# Patient Record
Sex: MALE | URBAN - METROPOLITAN AREA
[De-identification: names, ages, dates, MRNs, and addresses within clinical notes are randomized per-mention and may not be internally consistent; named-entity substitution may affect disease eponyms.]

---

## 2018-03-15 ENCOUNTER — APPOINTMENT (RX ONLY)
Dept: URBAN - METROPOLITAN AREA CLINIC 73 | Facility: CLINIC | Age: 43
Setting detail: DERMATOLOGY
End: 2018-03-15

## 2018-03-15 DIAGNOSIS — D485 NEOPLASM OF UNCERTAIN BEHAVIOR OF SKIN: ICD-10-CM

## 2018-03-15 DIAGNOSIS — D22 MELANOCYTIC NEVI: ICD-10-CM

## 2018-03-15 DIAGNOSIS — R20.2 PARESTHESIA OF SKIN: ICD-10-CM

## 2018-03-15 DIAGNOSIS — L72.0 EPIDERMAL CYST: ICD-10-CM

## 2018-03-15 DIAGNOSIS — L82.1 OTHER SEBORRHEIC KERATOSIS: ICD-10-CM

## 2018-03-15 PROBLEM — D22.5 MELANOCYTIC NEVI OF TRUNK: Status: ACTIVE | Noted: 2018-03-15

## 2018-03-15 PROBLEM — D48.5 NEOPLASM OF UNCERTAIN BEHAVIOR OF SKIN: Status: ACTIVE | Noted: 2018-03-15

## 2018-03-15 PROCEDURE — ? PRESCRIPTION

## 2018-03-15 PROCEDURE — 99203 OFFICE O/P NEW LOW 30 MIN: CPT

## 2018-03-15 PROCEDURE — ? OBSERVATION AND MEASURE

## 2018-03-15 PROCEDURE — ? COUNSELING

## 2018-03-15 PROCEDURE — ? TREATMENT REGIMEN

## 2018-03-15 RX ORDER — TRETINOIN 0.5 MG/G
CREAM TOPICAL
Qty: 1 | Refills: 2 | Status: ERX | COMMUNITY
Start: 2018-03-15

## 2018-03-15 RX ADMIN — TRETINOIN: 0.5 CREAM TOPICAL at 21:03

## 2018-03-15 ASSESSMENT — PAIN INTENSITY VAS: HOW INTENSE IS YOUR PAIN 0 BEING NO PAIN, 10 BEING THE MOST SEVERE PAIN POSSIBLE?: NO PAIN

## 2018-03-15 ASSESSMENT — LOCATION DETAILED DESCRIPTION DERM
LOCATION DETAILED: LEFT SUPERIOR MEDIAL MALAR CHEEK
LOCATION DETAILED: RIGHT SUPERIOR MEDIAL UPPER BACK
LOCATION DETAILED: SUPERIOR THORACIC SPINE

## 2018-03-15 ASSESSMENT — LOCATION SIMPLE DESCRIPTION DERM
LOCATION SIMPLE: UPPER BACK
LOCATION SIMPLE: RIGHT UPPER BACK
LOCATION SIMPLE: LEFT CHEEK

## 2018-03-15 ASSESSMENT — LOCATION ZONE DERM
LOCATION ZONE: TRUNK
LOCATION ZONE: FACE

## 2018-03-15 NOTE — HPI: SKIN LESION
Is This A New Presentation, Or A Follow-Up?: Skin Lesion
How Severe Is Your Skin Lesion?: moderate
Has Your Skin Lesion Been Treated?: not been treated
Is This A New Presentation, Or A Follow-Up?: Growths

## 2018-03-15 NOTE — PROCEDURE: TREATMENT REGIMEN
Samples Given: LUCITA 0.06%
Initiate Treatment: RA 0.05% crm qhs
Detail Level: Zone
Plan: Pt declining extraction for now.
Otc Regimen: Sarna lotion

## 2021-11-08 LAB
RAPID GROUP A STREP (OHS): NEGATIVE
SARS-COV-2 RNA RESP QL NAA+PROBE: NEGATIVE

## 2022-04-10 ENCOUNTER — HISTORICAL (OUTPATIENT)
Dept: ADMINISTRATIVE | Facility: HOSPITAL | Age: 47
End: 2022-04-10

## 2022-04-29 VITALS
OXYGEN SATURATION: 98 % | SYSTOLIC BLOOD PRESSURE: 135 MMHG | WEIGHT: 225 LBS | DIASTOLIC BLOOD PRESSURE: 93 MMHG | HEIGHT: 70 IN | BODY MASS INDEX: 32.21 KG/M2

## 2022-07-25 ENCOUNTER — OFFICE VISIT (OUTPATIENT)
Dept: URGENT CARE | Facility: CLINIC | Age: 47
End: 2022-07-25
Payer: COMMERCIAL

## 2022-07-25 VITALS
OXYGEN SATURATION: 100 % | HEIGHT: 70 IN | BODY MASS INDEX: 30.78 KG/M2 | HEART RATE: 69 BPM | RESPIRATION RATE: 17 BRPM | TEMPERATURE: 98 F | WEIGHT: 215 LBS | DIASTOLIC BLOOD PRESSURE: 72 MMHG | SYSTOLIC BLOOD PRESSURE: 107 MMHG

## 2022-07-25 DIAGNOSIS — J00 NASOPHARYNGITIS: Primary | ICD-10-CM

## 2022-07-25 PROCEDURE — 3078F DIAST BP <80 MM HG: CPT | Mod: CPTII,,, | Performed by: FAMILY MEDICINE

## 2022-07-25 PROCEDURE — 96372 THER/PROPH/DIAG INJ SC/IM: CPT | Mod: ,,, | Performed by: FAMILY MEDICINE

## 2022-07-25 PROCEDURE — 3074F PR MOST RECENT SYSTOLIC BLOOD PRESSURE < 130 MM HG: ICD-10-PCS | Mod: CPTII,,, | Performed by: FAMILY MEDICINE

## 2022-07-25 PROCEDURE — 1160F RVW MEDS BY RX/DR IN RCRD: CPT | Mod: CPTII,,, | Performed by: FAMILY MEDICINE

## 2022-07-25 PROCEDURE — 3008F BODY MASS INDEX DOCD: CPT | Mod: CPTII,,, | Performed by: FAMILY MEDICINE

## 2022-07-25 PROCEDURE — 1159F PR MEDICATION LIST DOCUMENTED IN MEDICAL RECORD: ICD-10-PCS | Mod: CPTII,,, | Performed by: FAMILY MEDICINE

## 2022-07-25 PROCEDURE — 1160F PR REVIEW ALL MEDS BY PRESCRIBER/CLIN PHARMACIST DOCUMENTED: ICD-10-PCS | Mod: CPTII,,, | Performed by: FAMILY MEDICINE

## 2022-07-25 PROCEDURE — 3074F SYST BP LT 130 MM HG: CPT | Mod: CPTII,,, | Performed by: FAMILY MEDICINE

## 2022-07-25 PROCEDURE — 3078F PR MOST RECENT DIASTOLIC BLOOD PRESSURE < 80 MM HG: ICD-10-PCS | Mod: CPTII,,, | Performed by: FAMILY MEDICINE

## 2022-07-25 PROCEDURE — 99213 PR OFFICE/OUTPT VISIT, EST, LEVL III, 20-29 MIN: ICD-10-PCS | Mod: 25,,, | Performed by: FAMILY MEDICINE

## 2022-07-25 PROCEDURE — 96372 PR INJECTION,THERAP/PROPH/DIAG2ST, IM OR SUBCUT: ICD-10-PCS | Mod: ,,, | Performed by: FAMILY MEDICINE

## 2022-07-25 PROCEDURE — 99213 OFFICE O/P EST LOW 20 MIN: CPT | Mod: 25,,, | Performed by: FAMILY MEDICINE

## 2022-07-25 PROCEDURE — 1159F MED LIST DOCD IN RCRD: CPT | Mod: CPTII,,, | Performed by: FAMILY MEDICINE

## 2022-07-25 PROCEDURE — 3008F PR BODY MASS INDEX (BMI) DOCUMENTED: ICD-10-PCS | Mod: CPTII,,, | Performed by: FAMILY MEDICINE

## 2022-07-25 RX ORDER — BETAMETHASONE SODIUM PHOSPHATE AND BETAMETHASONE ACETATE 3; 3 MG/ML; MG/ML
6 INJECTION, SUSPENSION INTRA-ARTICULAR; INTRALESIONAL; INTRAMUSCULAR; SOFT TISSUE
Status: COMPLETED | OUTPATIENT
Start: 2022-07-25 | End: 2022-07-25

## 2022-07-25 RX ADMIN — BETAMETHASONE SODIUM PHOSPHATE AND BETAMETHASONE ACETATE 6 MG: 3; 3 INJECTION, SUSPENSION INTRA-ARTICULAR; INTRALESIONAL; INTRAMUSCULAR; SOFT TISSUE at 12:07

## 2022-07-25 NOTE — PROGRESS NOTES
"Subjective:       Patient ID: Nieves Abrams is a 47 y.o. male.    Vitals:  height is 5' 10" (1.778 m) and weight is 97.5 kg (215 lb). His temperature is 98.2 °F (36.8 °C). His blood pressure is 107/72 and his pulse is 69. His respiration is 17 and oxygen saturation is 100%.     Chief Complaint: Sinus Problem    3 days of nasal drip, sinus pressure, sore throat. Of note pt had covid x 1 month ago.  No current fever or chest pain.      Sinus Problem  Associated symptoms include congestion, coughing, sinus pressure and a sore throat. Pertinent negatives include no shortness of breath.       Constitution: Negative for fever.   HENT: Positive for congestion, postnasal drip, sinus pressure and sore throat.    Cardiovascular: Negative for chest pain and palpitations.   Respiratory: Positive for cough. Negative for chest tightness and shortness of breath.    Gastrointestinal: Negative for nausea and vomiting.       Objective:      Physical Exam   Constitutional: He is oriented to person, place, and time. He appears well-developed. No distress.   HENT:   Head: Normocephalic.   Ears:   Right Ear: Tympanic membrane and external ear normal.   Left Ear: Tympanic membrane and external ear normal.   Nose: Rhinorrhea present.   Mouth/Throat: Uvula is midline and mucous membranes are normal. No uvula swelling. Cobblestoning present. No oropharyngeal exudate or posterior oropharyngeal edema. Tonsils are 0 on the right. Tonsils are 0 on the left. No tonsillar exudate.   Eyes: Pupils are equal, round, and reactive to light. Right eye exhibits no discharge. Left eye exhibits no discharge.   Neck: Neck supple. No tracheal deviation present.   Cardiovascular: Normal rate, regular rhythm and normal heart sounds.   No murmur heard.  Pulmonary/Chest: Effort normal and breath sounds normal. No stridor. No respiratory distress. He has no wheezes.   Lymphadenopathy:     He has no cervical adenopathy.   Neurological: He is alert and oriented " to person, place, and time.   Skin: Skin is warm and dry.   Psychiatric: Mood and thought content normal.   Nursing note and vitals reviewed.        Assessment:       1. Nasopharyngitis          Plan:         Nasopharyngitis  -     betamethasone acetate-betamethasone sodium phosphate injection 6 mg

## 2022-07-25 NOTE — PATIENT INSTRUCTIONS
Flonase and saline nasal spray twice a day, antihistamine at bedtime.  Force fluids. Cough may linger a few weeks but should not have fever, chest pain, or shortness of breath.   If sinus pressure remains for 6 days recommend calling us for consideration of antibiotic like Augmentin.

## 2022-08-07 ENCOUNTER — CLINICAL SUPPORT (OUTPATIENT)
Dept: URGENT CARE | Facility: CLINIC | Age: 47
End: 2022-08-07
Payer: COMMERCIAL

## 2022-08-07 VITALS
HEIGHT: 70 IN | SYSTOLIC BLOOD PRESSURE: 119 MMHG | TEMPERATURE: 98 F | HEART RATE: 70 BPM | DIASTOLIC BLOOD PRESSURE: 81 MMHG | WEIGHT: 215 LBS | RESPIRATION RATE: 16 BRPM | BODY MASS INDEX: 30.78 KG/M2 | OXYGEN SATURATION: 97 %

## 2022-08-07 DIAGNOSIS — J01.40 SUBACUTE PANSINUSITIS: Primary | ICD-10-CM

## 2022-08-07 PROCEDURE — 99212 OFFICE O/P EST SF 10 MIN: CPT | Mod: ,,, | Performed by: FAMILY MEDICINE

## 2022-08-07 PROCEDURE — 99212 PR OFFICE/OUTPT VISIT, EST, LEVL II, 10-19 MIN: ICD-10-PCS | Mod: ,,, | Performed by: FAMILY MEDICINE

## 2022-08-07 RX ORDER — AZITHROMYCIN 250 MG/1
TABLET, FILM COATED ORAL
Qty: 6 TABLET | Refills: 0 | Status: SHIPPED | OUTPATIENT
Start: 2022-08-07

## 2022-08-07 NOTE — PROGRESS NOTES
"Subjective:       Patient ID: Nieves Abrams is a 47 y.o. male.    Vitals:  height is 5' 10" (1.778 m) and weight is 97.5 kg (215 lb). His temperature is 98.1 °F (36.7 °C). His blood pressure is 119/81 and his pulse is 70. His respiration is 16 and oxygen saturation is 97%.     Chief Complaint: Sinus Problem (Still having sinus pressure, thick discolored phlem, not improved since visit on 07/25)    HPI:  47-year-old returns to clinic with continuing nasal congestion, sinus congestion, change in mucus color and thickness.  Seen in the clinic 2 weeks ago and received cortisone shot.  No fever, no body aches or headache.  States known for sinus infections in the past.  Requesting for antibiotic Z-Jh as it always helped.    ROS  :  Constitutional_ no fever, body aches or headache  HENT_ no sore throat or difficulty swallowing.  Other history As per HPI  Respiratory_no wheezing, no shortness of breath  Cardiovascular_no chest pain  Gastrointestinal_ No  vomiting, No diarrhea, No abdominal pain  Musculoskeletal_no joint pain, no joint swelling  Integumentary_no skin rash      Objective:      Physical Exam    General : Alert and Oriented, No apparent distress, afebrile  Neck - supple  HENT : Oropharynx no redness or swelling.TMs intact mild fluid no redness.   Respiratory : Bilateral equal breath sounds, nonlabored respirations  Cardiovascular : Rate, rhythm regular, normal volume pulse, no murmur  Integumentary : Warm, Dry and no rash    Assessment:       1. Subacute pansinusitis          Plan:       Cool mist vaporizer to keep there was moist and help draining sinuses.  Antihistamine of choice over-the-counter for condition.  Nasal rinse to unplug the sinuses.  Tylenol ibuprofen for pain and discomfort.  Call or return to clinic for any questions.    Subacute pansinusitis  -     azithromycin (Z-JH) 250 MG tablet; Take 2 tablets by mouth on day 1; Take 1 tablet by mouth on days 2-5  Dispense: 6 tablet; Refill: 0      "

## 2022-08-07 NOTE — PATIENT INSTRUCTIONS
Cool mist vaporizer to keep there was moist and help draining sinuses.  Antihistamine of choice over-the-counter for condition.  Nasal rinse to unplug the sinuses.  Tylenol ibuprofen for pain and discomfort.  Call or return to clinic for any questions.

## 2022-09-16 ENCOUNTER — HISTORICAL (OUTPATIENT)
Dept: ADMINISTRATIVE | Facility: HOSPITAL | Age: 47
End: 2022-09-16
Payer: COMMERCIAL

## 2023-02-25 ENCOUNTER — OFFICE VISIT (OUTPATIENT)
Dept: URGENT CARE | Facility: CLINIC | Age: 48
End: 2023-02-25
Payer: COMMERCIAL

## 2023-02-25 VITALS
HEIGHT: 70 IN | OXYGEN SATURATION: 98 % | BODY MASS INDEX: 29.35 KG/M2 | RESPIRATION RATE: 18 BRPM | HEART RATE: 88 BPM | SYSTOLIC BLOOD PRESSURE: 104 MMHG | DIASTOLIC BLOOD PRESSURE: 73 MMHG | TEMPERATURE: 99 F | WEIGHT: 205 LBS

## 2023-02-25 DIAGNOSIS — J30.9 ALLERGIC SINUSITIS: Primary | ICD-10-CM

## 2023-02-25 PROCEDURE — 1160F RVW MEDS BY RX/DR IN RCRD: CPT | Mod: CPTII,,, | Performed by: FAMILY MEDICINE

## 2023-02-25 PROCEDURE — 99213 OFFICE O/P EST LOW 20 MIN: CPT | Mod: 25,,, | Performed by: FAMILY MEDICINE

## 2023-02-25 PROCEDURE — 3078F PR MOST RECENT DIASTOLIC BLOOD PRESSURE < 80 MM HG: ICD-10-PCS | Mod: CPTII,,, | Performed by: FAMILY MEDICINE

## 2023-02-25 PROCEDURE — 1159F PR MEDICATION LIST DOCUMENTED IN MEDICAL RECORD: ICD-10-PCS | Mod: CPTII,,, | Performed by: FAMILY MEDICINE

## 2023-02-25 PROCEDURE — 3078F DIAST BP <80 MM HG: CPT | Mod: CPTII,,, | Performed by: FAMILY MEDICINE

## 2023-02-25 PROCEDURE — 3074F SYST BP LT 130 MM HG: CPT | Mod: CPTII,,, | Performed by: FAMILY MEDICINE

## 2023-02-25 PROCEDURE — 3008F PR BODY MASS INDEX (BMI) DOCUMENTED: ICD-10-PCS | Mod: CPTII,,, | Performed by: FAMILY MEDICINE

## 2023-02-25 PROCEDURE — 96372 THER/PROPH/DIAG INJ SC/IM: CPT | Mod: ,,, | Performed by: FAMILY MEDICINE

## 2023-02-25 PROCEDURE — 1160F PR REVIEW ALL MEDS BY PRESCRIBER/CLIN PHARMACIST DOCUMENTED: ICD-10-PCS | Mod: CPTII,,, | Performed by: FAMILY MEDICINE

## 2023-02-25 PROCEDURE — 99213 PR OFFICE/OUTPT VISIT, EST, LEVL III, 20-29 MIN: ICD-10-PCS | Mod: 25,,, | Performed by: FAMILY MEDICINE

## 2023-02-25 PROCEDURE — 96372 PR INJECTION,THERAP/PROPH/DIAG2ST, IM OR SUBCUT: ICD-10-PCS | Mod: ,,, | Performed by: FAMILY MEDICINE

## 2023-02-25 PROCEDURE — 3074F PR MOST RECENT SYSTOLIC BLOOD PRESSURE < 130 MM HG: ICD-10-PCS | Mod: CPTII,,, | Performed by: FAMILY MEDICINE

## 2023-02-25 PROCEDURE — 3008F BODY MASS INDEX DOCD: CPT | Mod: CPTII,,, | Performed by: FAMILY MEDICINE

## 2023-02-25 PROCEDURE — 1159F MED LIST DOCD IN RCRD: CPT | Mod: CPTII,,, | Performed by: FAMILY MEDICINE

## 2023-02-25 RX ORDER — BETAMETHASONE SODIUM PHOSPHATE AND BETAMETHASONE ACETATE 3; 3 MG/ML; MG/ML
6 INJECTION, SUSPENSION INTRA-ARTICULAR; INTRALESIONAL; INTRAMUSCULAR; SOFT TISSUE
Status: COMPLETED | OUTPATIENT
Start: 2023-02-25 | End: 2023-02-25

## 2023-02-25 RX ADMIN — BETAMETHASONE SODIUM PHOSPHATE AND BETAMETHASONE ACETATE 6 MG: 3; 3 INJECTION, SUSPENSION INTRA-ARTICULAR; INTRALESIONAL; INTRAMUSCULAR; SOFT TISSUE at 09:02

## 2023-02-25 NOTE — PROGRESS NOTES
"Subjective:       Patient ID: Nieves Abrams is a 47 y.o. male.    Vitals:  height is 5' 10" (1.778 m) and weight is 93 kg (205 lb). His oral temperature is 98.8 °F (37.1 °C). His blood pressure is 104/73 and his pulse is 88. His respiration is 18 and oxygen saturation is 98%.     Chief Complaint: Nasal Congestion and sneezing (47 y.o. male presents to clinic c/o nasal congestion, sneezing x 2 days. )    47 y.o. male presents to clinic c/o nasal congestion, sneezing x 2 days. Declines testing today.     Constitution: Negative for fever.   HENT:  Positive for congestion, postnasal drip, sinus pressure and sore throat.    Cardiovascular:  Negative for chest pain and palpitations.   Respiratory:  Positive for cough. Negative for chest tightness and shortness of breath.    Gastrointestinal:  Negative for nausea and vomiting.     Objective:      Physical Exam   Constitutional: He is oriented to person, place, and time. He appears well-developed. No distress.   HENT:   Head: Normocephalic.   Ears:   Right Ear: Tympanic membrane and external ear normal.   Left Ear: Tympanic membrane and external ear normal.   Nose: Rhinorrhea present.   Mouth/Throat: Uvula is midline and mucous membranes are normal. No uvula swelling. Cobblestoning present. No oropharyngeal exudate or posterior oropharyngeal edema. Tonsils are 0 on the right. Tonsils are 0 on the left. No tonsillar exudate.   Eyes: Pupils are equal, round, and reactive to light. Right eye exhibits no discharge. Left eye exhibits no discharge.   Neck: Neck supple. No tracheal deviation present.   Cardiovascular: Normal rate, regular rhythm and normal heart sounds.   No murmur heard.  Pulmonary/Chest: Effort normal and breath sounds normal. No stridor. No respiratory distress. He has no wheezes.   Lymphadenopathy:     He has no cervical adenopathy.   Neurological: no focal deficit. He is alert and oriented to person, place, and time.   Skin: Skin is warm and dry. "   Psychiatric: Mood and thought content normal.   Nursing note and vitals reviewed.      Assessment:       1. Allergic sinusitis          Plan:         Allergic sinusitis  -     betamethasone acetate-betamethasone sodium phosphate injection 6 mg

## 2023-02-25 NOTE — PATIENT INSTRUCTIONS
Flonase and saline nasal spray twice a day, antihistamine at bedtime.  Force fluids.  Celestone injection given today, discussed sided effects.

## 2023-08-26 ENCOUNTER — OFFICE VISIT (OUTPATIENT)
Dept: URGENT CARE | Facility: CLINIC | Age: 48
End: 2023-08-26
Payer: COMMERCIAL

## 2023-08-26 VITALS
BODY MASS INDEX: 29.35 KG/M2 | RESPIRATION RATE: 20 BRPM | DIASTOLIC BLOOD PRESSURE: 77 MMHG | TEMPERATURE: 98 F | HEART RATE: 62 BPM | SYSTOLIC BLOOD PRESSURE: 113 MMHG | HEIGHT: 70 IN | WEIGHT: 205 LBS | OXYGEN SATURATION: 99 %

## 2023-08-26 DIAGNOSIS — J00 NASOPHARYNGITIS: Primary | ICD-10-CM

## 2023-08-26 PROCEDURE — 99213 OFFICE O/P EST LOW 20 MIN: CPT | Mod: 25,,, | Performed by: FAMILY MEDICINE

## 2023-08-26 PROCEDURE — 96372 THER/PROPH/DIAG INJ SC/IM: CPT | Mod: ,,, | Performed by: FAMILY MEDICINE

## 2023-08-26 PROCEDURE — 96372 PR INJECTION,THERAP/PROPH/DIAG2ST, IM OR SUBCUT: ICD-10-PCS | Mod: ,,, | Performed by: FAMILY MEDICINE

## 2023-08-26 PROCEDURE — 99213 PR OFFICE/OUTPT VISIT, EST, LEVL III, 20-29 MIN: ICD-10-PCS | Mod: 25,,, | Performed by: FAMILY MEDICINE

## 2023-08-26 RX ORDER — BETAMETHASONE SODIUM PHOSPHATE AND BETAMETHASONE ACETATE 3; 3 MG/ML; MG/ML
6 INJECTION, SUSPENSION INTRA-ARTICULAR; INTRALESIONAL; INTRAMUSCULAR; SOFT TISSUE
Status: COMPLETED | OUTPATIENT
Start: 2023-08-26 | End: 2023-08-26

## 2023-08-26 RX ADMIN — BETAMETHASONE SODIUM PHOSPHATE AND BETAMETHASONE ACETATE 6 MG: 3; 3 INJECTION, SUSPENSION INTRA-ARTICULAR; INTRALESIONAL; INTRAMUSCULAR; SOFT TISSUE at 08:08

## 2023-08-26 NOTE — PATIENT INSTRUCTIONS
Flonase and saline nasal spray twice a day, antihistamine at bedtime.  Force fluids.  Celestone injection given today.

## 2023-08-26 NOTE — PROGRESS NOTES
"Subjective:      Patient ID: Nieves Abrams is a 48 y.o. male.    Vitals:  height is 5' 10" (1.778 m) and weight is 93 kg (205 lb). His temperature is 97.7 °F (36.5 °C). His blood pressure is 113/77 and his pulse is 62. His respiration is 20 and oxygen saturation is 99%.     Chief Complaint: Cough (Productive cough and dry, dull headache, chest congestion, sore throat,  post nasal drip, started 3 wks ago, taken zinc, vitamin c, d3, colostrum, some relief. )    3 weeks of continued cough and congestion. No focal chest pain. No recent fever. No TUCKER.         Constitution: Negative for fever.   HENT:  Positive for congestion, postnasal drip, sinus pressure and sore throat.    Cardiovascular:  Negative for chest pain and palpitations.   Respiratory:  Positive for cough. Negative for chest tightness and shortness of breath.    Gastrointestinal:  Negative for nausea and vomiting.      Objective:     Physical Exam   Constitutional: He is oriented to person, place, and time. He appears well-developed.  Non-toxic appearance. No distress.   HENT:   Head: Normocephalic.   Ears:   Right Ear: Tympanic membrane and external ear normal.   Left Ear: Tympanic membrane and external ear normal.   Nose: Rhinorrhea present.   Mouth/Throat: Uvula is midline and mucous membranes are normal. No uvula swelling. Posterior oropharyngeal erythema and cobblestoning present. No oropharyngeal exudate or posterior oropharyngeal edema. Tonsils are 0 on the right. Tonsils are 0 on the left. No tonsillar exudate.   Eyes: Pupils are equal, round, and reactive to light. Right eye exhibits no discharge. Left eye exhibits no discharge.   Neck: Neck supple. No tracheal deviation present.   Cardiovascular: Normal rate, regular rhythm and normal heart sounds.   No murmur heard.  Pulmonary/Chest: Effort normal and breath sounds normal. No stridor. No respiratory distress. He has no wheezes.   Lymphadenopathy:     He has no cervical adenopathy. "   Neurological: no focal deficit. He is alert and oriented to person, place, and time.   Skin: Skin is warm and dry.   Psychiatric: Mood and thought content normal.   Nursing note and vitals reviewed.      Assessment:     1. Nasopharyngitis        Plan:       Nasopharyngitis  -     betamethasone acetate-betamethasone sodium phosphate injection 6 mg

## 2024-06-02 ENCOUNTER — OFFICE VISIT (OUTPATIENT)
Dept: URGENT CARE | Facility: CLINIC | Age: 49
End: 2024-06-02
Payer: COMMERCIAL

## 2024-06-02 VITALS
TEMPERATURE: 99 F | BODY MASS INDEX: 31.35 KG/M2 | RESPIRATION RATE: 16 BRPM | SYSTOLIC BLOOD PRESSURE: 110 MMHG | HEART RATE: 69 BPM | DIASTOLIC BLOOD PRESSURE: 77 MMHG | OXYGEN SATURATION: 98 % | WEIGHT: 219 LBS | HEIGHT: 70 IN

## 2024-06-02 DIAGNOSIS — J30.9 ALLERGIC SINUSITIS: Primary | ICD-10-CM

## 2024-06-02 PROCEDURE — 96372 THER/PROPH/DIAG INJ SC/IM: CPT | Mod: ,,,

## 2024-06-02 PROCEDURE — 99213 OFFICE O/P EST LOW 20 MIN: CPT | Mod: 25,,,

## 2024-06-02 RX ORDER — BETAMETHASONE SODIUM PHOSPHATE AND BETAMETHASONE ACETATE 3; 3 MG/ML; MG/ML
9 INJECTION, SUSPENSION INTRA-ARTICULAR; INTRALESIONAL; INTRAMUSCULAR; SOFT TISSUE
Status: COMPLETED | OUTPATIENT
Start: 2024-06-02 | End: 2024-06-02

## 2024-06-02 RX ADMIN — BETAMETHASONE SODIUM PHOSPHATE AND BETAMETHASONE ACETATE 9 MG: 3; 3 INJECTION, SUSPENSION INTRA-ARTICULAR; INTRALESIONAL; INTRAMUSCULAR; SOFT TISSUE at 08:06

## 2024-06-02 NOTE — PATIENT INSTRUCTIONS
Take OTC Decongestants  (Claritin D/sudafed OR Coricidin for people with HTN) to cut down on the fluid in the lining of your nose and relieve swollen nasal passages and congestion. May also use cough/cold/congestion medication such as Dayquil/Nyquil and/or antihistamine such as Claritin/Zyrtec/Allegra.  Cepacol sore throat lozenges/spray if needed.     OTC Zinc Lozenge to help decrease cold symptoms faster.    Drink plenty of fluids.  Rest.      Follow up with primary care in 5-6 days if not better.     Go directly to emergency room if you begin to have shortness of breath, chest pain, or other worrisome symptoms.

## 2024-06-02 NOTE — PROGRESS NOTES
"Subjective:      Patient ID: Nieves Abrams is a 49 y.o. male.    Vitals:  height is 5' 10" (1.778 m) and weight is 99.3 kg (219 lb). His temperature is 98.5 °F (36.9 °C). His blood pressure is 110/77 and his pulse is 69. His respiration is 16 and oxygen saturation is 98%.     Chief Complaint: Sinus Problem    Patient is a 49 y.o. male who presents to urgent care with complaints of nasal congestion, PND, throat irritation, sinus pressure, mild cough due to throat irritation x 4 days. Alleviating factors include Ibuprofen, Vitamin C, Zinc with moderate amount of relief. Patient denies fever, SOB, nausea, vomiting, or diarrhea. Patient is requesting steroid injection.     Sinus Problem  Associated symptoms include congestion, coughing and a sore throat.     HENT:  Positive for congestion, postnasal drip and sore throat.    Respiratory:  Positive for cough.       Objective:     Physical Exam   Constitutional: He is oriented to person, place, and time. He appears well-developed. He is cooperative.  Non-toxic appearance. He does not appear ill. No distress.   HENT:   Head: Normocephalic and atraumatic.   Ears:   Right Ear: Hearing, tympanic membrane, external ear and ear canal normal.   Left Ear: Hearing, tympanic membrane, external ear and ear canal normal.   Nose: Congestion present.   Mouth/Throat: Uvula is midline and mucous membranes are normal. No trismus in the jaw. Normal dentition. No uvula swelling. Posterior oropharyngeal erythema present. No oropharyngeal exudate or posterior oropharyngeal edema.      Comments:  Clear postnasal drip noted  Eyes: Conjunctivae and lids are normal. Right conjunctiva is not injected. Left conjunctiva is not injected.   Neck: Neck supple. No edema present. No erythema present. No neck rigidity present.   Cardiovascular: Normal rate, regular rhythm, normal heart sounds and normal pulses.   Pulmonary/Chest: Effort normal and breath sounds normal. No respiratory distress. He has no " decreased breath sounds. He has no rhonchi.   Abdominal: Normal appearance and bowel sounds are normal. There is no abdominal tenderness.   Neurological: He is alert and oriented to person, place, and time. He exhibits normal muscle tone. Coordination normal.   Skin: Skin is warm, intact, not diaphoretic and no rash.   Psychiatric: His speech is normal and behavior is normal. Mood, judgment and thought content normal.   Nursing note and vitals reviewed.      Assessment:     1. Allergic sinusitis        Plan:       Allergic sinusitis  -     betamethasone acetate-betamethasone sodium phosphate injection 9 mg      Take OTC Decongestants  (Claritin D/sudafed OR Coricidin for people with HTN) to cut down on the fluid in the lining of your nose and relieve swollen nasal passages and congestion. May also use cough/cold/congestion medication such as Dayquil/Nyquil and/or antihistamine such as Claritin/Zyrtec/Allegra.  Cepacol sore throat lozenges/spray if needed.     OTC Zinc Lozenge to help decrease cold symptoms faster.    Drink plenty of fluids.  Rest.      Follow up with primary care in 5-6 days if not better.     Go directly to emergency room if you begin to have shortness of breath, chest pain, or other worrisome symptoms.

## 2024-10-14 ENCOUNTER — OFFICE VISIT (OUTPATIENT)
Dept: URGENT CARE | Facility: CLINIC | Age: 49
End: 2024-10-14
Payer: COMMERCIAL

## 2024-10-14 VITALS
BODY MASS INDEX: 32.35 KG/M2 | WEIGHT: 226 LBS | TEMPERATURE: 99 F | HEIGHT: 70 IN | RESPIRATION RATE: 18 BRPM | HEART RATE: 86 BPM | SYSTOLIC BLOOD PRESSURE: 119 MMHG | OXYGEN SATURATION: 100 % | DIASTOLIC BLOOD PRESSURE: 79 MMHG

## 2024-10-14 DIAGNOSIS — J01.90 ACUTE SINUSITIS, RECURRENCE NOT SPECIFIED, UNSPECIFIED LOCATION: Primary | ICD-10-CM

## 2024-10-14 PROCEDURE — 99213 OFFICE O/P EST LOW 20 MIN: CPT | Mod: 25,,,

## 2024-10-14 PROCEDURE — 96372 THER/PROPH/DIAG INJ SC/IM: CPT | Mod: ,,,

## 2024-10-14 RX ORDER — DEXAMETHASONE SODIUM PHOSPHATE 10 MG/ML
10 INJECTION INTRAMUSCULAR; INTRAVENOUS
Status: COMPLETED | OUTPATIENT
Start: 2024-10-14 | End: 2024-10-14

## 2024-10-14 RX ADMIN — DEXAMETHASONE SODIUM PHOSPHATE 10 MG: 10 INJECTION INTRAMUSCULAR; INTRAVENOUS at 12:10

## 2024-10-14 NOTE — PROGRESS NOTES
"Subjective:      Patient ID: Nieves Abrams is a 49 y.o. male.    Vitals:  height is 5' 10" (1.778 m) and weight is 102.5 kg (226 lb). His oral temperature is 98.6 °F (37 °C). His blood pressure is 119/79 and his pulse is 86. His respiration is 18 and oxygen saturation is 100%.     Chief Complaint: Sinus Problem     Patient is a 49 y.o. male who presents to urgent care with complaints of nasal congestion, sinus pressure, post nasal drip, intermittent cough x10 days. Unresponsive to OTC meds. 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  Patient denies fever, body aches, chills, chest pain, SOB, wheezing.      ROS   Objective:     Physical Exam   Constitutional: He is oriented to person, place, and time. He appears well-developed. He is cooperative.  Non-toxic appearance. He does not appear ill. No distress.   HENT:   Head: Normocephalic and atraumatic.   Ears:   Right Ear: Hearing and external ear normal. A middle ear effusion is present.   Left Ear: Hearing and external ear normal. A middle ear effusion is present.   Nose: Congestion present. Right sinus exhibits maxillary sinus tenderness. Left sinus exhibits maxillary sinus tenderness.   Mouth/Throat: Mucous membranes are normal. Mucous membranes are moist. Posterior oropharyngeal erythema (clear pnd) present. Oropharynx is clear.   Eyes: Conjunctivae and lids are normal.   Neck: Trachea normal and phonation normal. Neck supple. No edema present. No erythema present. No neck rigidity present.   Cardiovascular: Normal rate, regular rhythm and normal heart sounds.   Pulmonary/Chest: Effort normal and breath sounds normal. No stridor. No respiratory distress. He has no decreased breath sounds. He has no wheezes. He has no rhonchi. He has no rales.   Abdominal: Normal appearance.   Neurological: He is alert and oriented to person, place, and time. He exhibits normal muscle tone.   Skin: Skin is warm, intact, not diaphoretic " and no rash. Capillary refill takes less than 2 seconds.   Psychiatric: His speech is normal and behavior is normal. Mood normal.   Nursing note and vitals reviewed.      Assessment:     1. Acute sinusitis, recurrence not specified, unspecified location        Plan:       Acute sinusitis, recurrence not specified, unspecified location    Other orders  -     dexAMETHasone injection 10 mg      See paper RX for oral prednisone; scanned into chart   Patient declined antibiotic at this time     Start taking an allergy pill daily such as claritin, zyrtec, allegrea or xyzal. Also start using a nasal steroid spray such as flonase or nasacort daily. If you are not being treated for high blood pressure, you can also take decongestant such as sudafed as needed. They can be purchased over the counter. If oral steroids were prescribed, start them tomorrow morning. Monitor for fever. Take tylenol/acetaminophen or ibuprofen as needed. Rest and hydrate.   For persistent symptoms despite the steroids, call the clinic to discussed antibiotics if needed.   If symptoms persist or worsen, return to clinic or seek medical attention immediately.

## 2024-10-20 ENCOUNTER — TELEPHONE (OUTPATIENT)
Dept: URGENT CARE | Facility: CLINIC | Age: 49
End: 2024-10-20
Payer: COMMERCIAL

## 2024-10-20 RX ORDER — DOXYCYCLINE 100 MG/1
100 CAPSULE ORAL EVERY 12 HOURS
Qty: 14 CAPSULE | Refills: 0 | Status: SHIPPED | OUTPATIENT
Start: 2024-10-20 | End: 2024-10-27

## 2024-10-20 NOTE — TELEPHONE ENCOUNTER
Patient was seen in clinic on 10/14/24. Dx: acute sinusitis, no rx given. Patient received a steroid injection in clinic. He is calling the clinic today requesting that he be prescribed an antibiotic. He reports that he is still experiencing sinus drainage which is causing him to cough.  Please advise.

## 2024-10-20 NOTE — TELEPHONE ENCOUNTER
I spoke with patient regarding the following. Patient denies SOB or fever at this time. I informed him that the antibiotic was sent to the pharmacy and advised reevaluation should he develop SOB/fever. Patient verbalized understanding and expressed thanks.

## 2025-02-15 ENCOUNTER — OFFICE VISIT (OUTPATIENT)
Dept: URGENT CARE | Facility: CLINIC | Age: 50
End: 2025-02-15
Payer: COMMERCIAL

## 2025-02-15 VITALS
HEART RATE: 65 BPM | TEMPERATURE: 98 F | OXYGEN SATURATION: 99 % | RESPIRATION RATE: 18 BRPM | WEIGHT: 210.81 LBS | SYSTOLIC BLOOD PRESSURE: 112 MMHG | HEIGHT: 70 IN | BODY MASS INDEX: 30.18 KG/M2 | DIASTOLIC BLOOD PRESSURE: 77 MMHG

## 2025-02-15 DIAGNOSIS — M54.50 ACUTE LEFT-SIDED LOW BACK PAIN WITHOUT SCIATICA: Primary | ICD-10-CM

## 2025-02-15 RX ORDER — LORAZEPAM 1 MG/1
1-2 TABLET ORAL EVERY 12 HOURS PRN
COMMUNITY
Start: 2025-02-13

## 2025-02-15 RX ORDER — CYCLOBENZAPRINE HCL 5 MG
TABLET ORAL
Qty: 20 TABLET | Refills: 0 | Status: SHIPPED | OUTPATIENT
Start: 2025-02-15

## 2025-02-15 NOTE — PATIENT INSTRUCTIONS
Discussed the physical finding, concerns of muscle strain.  Monitor the symptoms closely.  Topical heating pad 3 to 4 times a day as tolerated  Aleve 1-2 tabs every 12 hours as needed for pain.  Activities as tolerated.  Muscle relaxant mainly at bedtime, caution can cause drowsiness.  Do not duplicate with lorazepam.  With persistent and worsening symptoms may need further evaluation.  Call or return to clinic for any questions.  Follow up with primary MD  Encouraged stretching

## 2025-02-15 NOTE — PROGRESS NOTES
"Subjective:      Patient ID: Nieves Abrams is a 49 y.o. male.    Vitals:  height is 5' 10" (1.778 m) and weight is 95.6 kg (210 lb 12.8 oz). His oral temperature is 98.1 °F (36.7 °C). His blood pressure is 112/77 and his pulse is 65. His respiration is 18 and oxygen saturation is 99%.     Chief Complaint: Back Pain     Patient is a 49 y.o. male who presents to urgent care with complaints of lower back muscle sprain due to sitting for long periods of time on a flight x 4 days. Alleviating factors include lorazepam, heating pad, tylenol and ibuprofen with mild amount of relief.       Back Pain      [place]:  49-year-old male otherwise healthy present to clinic with concerns of left low back pain.   early this week was on a flight, for work, was [place] 5[place]1/2 hours.  Cause of the delay.  And when he got up felt muscle strain low back.  Patient has done heating pad and over-the-counter medication not much help.  With not much improvement was seen at another urgent care clinic in [place].  Denies tingling numbness or weakness to lower extremities or any part of the body.  No change in bowel movements or bladder habits.  Continues his activities as usual.  States was prescribed lorazepam considering his anxiety getting on the plane.  Medications some help.  Continues to walk with full weight-bearing.  No similar complaints in the past.    ROS:  Constitutional : No fever, no fatigue  Neck : Negative except HPI  Respiratory : No shortness of breath, no wheezing  Cardiovascular : No chest pain  Musculoskeletal : Negative except HPI  Integumentary : No rash, no abnormal lesion  Neurological : Negative for tingling numbness and weakness      Objective:     Physical Exam  General : Alert and oriented, No apparent distress, Afebrile  Neck -: supple, Non Tender  Respiratory :Lungs are clear to auscultate, Breath sounds are equal  Cardiovascular : Normal rate, normal volume pulse " bilateral  Musculoskeletal :  Gait appears normal, spine no point tenderness, left SI joint tender to palpate, right SI joint nontender to palpate.  Muscles around on left side pressure to palpate.  No bruising or swelling.  Hip range of motion limited due to pain.  SLR 90° bilateral  Integumentary : Warm, Dry   Neurologic : Alert and Oriented X 4, sensations intact, motor intact   Assessment:     1. Acute left-sided low back pain without sciatica      Plan:   Discussed the physical finding, concerns of muscle strain.  Monitor the symptoms closely.  Topical heating pad 3 to 4 times a day as tolerated  Aleve 1-2 tabs every 12 hours as needed for pain.  Activities as tolerated.  Muscle relaxant mainly at bedtime, caution can cause drowsiness.  Do not duplicate with lorazepam.  With persistent and worsening symptoms may need further evaluation.  Call or return to clinic for any questions.  Follow up with primary MD  Encouraged stretching  Acute left-sided low back pain without sciatica  -     cyclobenzaprine (FLEXERIL) 5 MG tablet; 1-2 tabs orally at bedtime for muscle spasm, caution can cause drowsiness.  Do not duplicate with lorazepam  Dispense: 20 tablet; Refill: 0

## 2025-03-28 ENCOUNTER — OFFICE VISIT (OUTPATIENT)
Dept: URGENT CARE | Facility: CLINIC | Age: 50
End: 2025-03-28
Payer: COMMERCIAL

## 2025-03-28 VITALS
DIASTOLIC BLOOD PRESSURE: 82 MMHG | HEART RATE: 82 BPM | HEIGHT: 70 IN | TEMPERATURE: 98 F | WEIGHT: 206 LBS | RESPIRATION RATE: 18 BRPM | BODY MASS INDEX: 29.49 KG/M2 | OXYGEN SATURATION: 98 % | SYSTOLIC BLOOD PRESSURE: 126 MMHG

## 2025-03-28 DIAGNOSIS — J01.40 ACUTE NON-RECURRENT PANSINUSITIS: Primary | ICD-10-CM

## 2025-03-28 RX ORDER — BETAMETHASONE SODIUM PHOSPHATE AND BETAMETHASONE ACETATE 3; 3 MG/ML; MG/ML
9 INJECTION, SUSPENSION INTRA-ARTICULAR; INTRALESIONAL; INTRAMUSCULAR; SOFT TISSUE ONCE
Status: COMPLETED | OUTPATIENT
Start: 2025-03-28 | End: 2025-03-28

## 2025-03-28 RX ORDER — PREDNISONE 20 MG/1
20 TABLET ORAL 2 TIMES DAILY
Qty: 4 TABLET | Refills: 0 | Status: SHIPPED | OUTPATIENT
Start: 2025-03-30 | End: 2025-04-01

## 2025-03-28 RX ADMIN — BETAMETHASONE SODIUM PHOSPHATE AND BETAMETHASONE ACETATE 9 MG: 3; 3 INJECTION, SUSPENSION INTRA-ARTICULAR; INTRALESIONAL; INTRAMUSCULAR; SOFT TISSUE at 09:03

## 2025-03-28 NOTE — PROGRESS NOTES
"Subjective:      Patient ID: Nieves Abrams is a 50 y.o. male.    Vitals:  height is 5' 10" (1.778 m) and weight is 93.4 kg (206 lb). His temperature is 98.4 °F (36.9 °C). His blood pressure is 126/82 and his pulse is 82. His respiration is 18 and oxygen saturation is 98%.     Chief Complaint: Sinus Problem     Patient is a 50 y.o. male who presents to urgent care with complaints of sinus pressure , cough, hoarse, chest congestion, yellow mucus x Saturday . Alleviating factors include none.       [place]:  50-year-old male otherwise healthy present to clinic with concerns of worsening nasal congestion, sinus congestion, postnasal drip and coughing since 6-7 days.  Symptoms gradual in onset and worsening.  States prior to the symptoms he was traveling.  No concerns of positive exposure to infections.  Considering the duration defers testing today.  No fever in the clinic.  Over-the-counter medication not much help.  Requesting for cortisone injection as it helped in the past.  Understands the risks and benefits    ROS :  Constitutional : No fever, no fatigue  Neck : Negative except HPI  HENT :  Worsening nasal congestion, sinus congestion, facial pressure, postnasal drip  Respiratory : No shortness of breath, no wheezing, positive for coughing, yellow mucus this morning  Cardiovascular : No chest pain, no palpitations  Musculoskeletal : Negative except HPI  Integumentary : No rash, no abnormal lesion  Neurological : Negative for tingling numbness and weakness   Objective:     Physical Exam  General : Alert and Oriented, No apparent distress, afebrile, sounds stuffy congested and coughing  Neck - supple  HENT : Oropharynx no redness or swelling. Tonsils not enlarged, bilateral TMs intact mild fluid no redness.   Respiratory : Bilateral equal breath sounds, nonlabored respirations, no wheezing, no bronchial breath sounds  Cardiovascular : Rate, rhythm regular, normal volume pulse, no murmur  Gastrointestinal: Full " abdomen, soft, nontender to palpate  Integumentary : Warm, Dry and no rash    Patient defers antibiotics today  Assessment:     1. Acute non-recurrent pansinusitis      Plan:   Discussed the physical finding, concerns of allergies or viral etiology.  Monitor the symptoms closely.  Continue antihistamine of choice over-the-counter like Claritin Zyrtec or Allegra for congestion along with Flonase for 3-4 weeks  Celestone IM today as anti inflammation for symptom relief, risk and benefits discussed voiced understanding  Prednisone for persistent and worsening symptoms as needed  For worsening symptoms and signs of infection call clinic will consider antibiotics.  Tylenol or ibuprofen for pain and discomfort.  Call or return to clinic for any questions    Acute non-recurrent pansinusitis  -     betamethasone acetate-betamethasone sodium phosphate injection 9 mg  -     predniSONE (DELTASONE) 20 MG tablet; Take 1 tablet (20 mg total) by mouth 2 (two) times daily. for 2 days  Dispense: 4 tablet; Refill: 0

## 2025-03-28 NOTE — PATIENT INSTRUCTIONS
Discussed the physical finding, concerns of allergies or viral etiology.  Monitor the symptoms closely.  Continue antihistamine of choice over-the-counter like Claritin Zyrtec or Allegra for congestion along with Flonase for 3-4 weeks  Celestone IM today as anti inflammation for symptom relief, risk and benefits discussed voiced understanding  Prednisone for persistent and worsening symptoms as needed  For worsening symptoms and signs of infection call clinic will consider antibiotics.  Tylenol or ibuprofen for pain and discomfort.  Call or return to clinic for any questions